# Patient Record
Sex: FEMALE | Race: WHITE | NOT HISPANIC OR LATINO | URBAN - METROPOLITAN AREA
[De-identification: names, ages, dates, MRNs, and addresses within clinical notes are randomized per-mention and may not be internally consistent; named-entity substitution may affect disease eponyms.]

---

## 2017-04-13 ENCOUNTER — OFFICE (AMBULATORY)
Dept: URBAN - METROPOLITAN AREA CLINIC 75 | Facility: CLINIC | Age: 47
End: 2017-04-13

## 2017-04-13 VITALS
DIASTOLIC BLOOD PRESSURE: 76 MMHG | HEIGHT: 62 IN | HEART RATE: 71 BPM | SYSTOLIC BLOOD PRESSURE: 128 MMHG | WEIGHT: 193 LBS

## 2017-04-13 DIAGNOSIS — R10.13 EPIGASTRIC PAIN: ICD-10-CM

## 2017-04-13 DIAGNOSIS — R10.32 LEFT LOWER QUADRANT PAIN: ICD-10-CM

## 2017-04-13 DIAGNOSIS — K59.00 CONSTIPATION, UNSPECIFIED: ICD-10-CM

## 2017-04-13 DIAGNOSIS — R19.4 CHANGE IN BOWEL HABIT: ICD-10-CM

## 2017-04-13 DIAGNOSIS — K51.511 LEFT SIDED COLITIS WITH RECTAL BLEEDING: ICD-10-CM

## 2017-04-13 DIAGNOSIS — R63.4 ABNORMAL WEIGHT LOSS: ICD-10-CM

## 2017-04-13 DIAGNOSIS — R63.0 ANOREXIA: ICD-10-CM

## 2017-04-13 DIAGNOSIS — R93.3 ABNORMAL FINDINGS ON DIAGNOSTIC IMAGING OF OTHER PARTS OF DI: ICD-10-CM

## 2017-04-13 PROCEDURE — 99204 OFFICE O/P NEW MOD 45 MIN: CPT | Performed by: INTERNAL MEDICINE

## 2017-04-13 NOTE — SERVICEHPINOTES
Thank you very much for allowing me to evaluate Ms. Garcia. As you know she is a pleasant 46-year-old white female who I actually saw about 13 years ago, she underwent cholecystectomy and I did an ERCP on the patient. She is here now because of weeks ago she ended up in the hospital. She says that she went to the bathroom, she had some cramping and passed blood. The symptoms persisted associated of going to be emerging room and was admitted. CT scan reportedly showed colitis and she said she did not think that passed blood multiple times. She was in the hospital for 5 days. She was evaluated by infectious disease, workup for C. difficile was negative. She was treated with antibiotics and dicyclomine. She says when she was discharged she was only on clears but she said she needed to get out of the hospital. She also has had loss of appetite, she said she is only really been able to tolerate solid food for the past week. She said result weight loss. Her bleeding has resolved. Her abdominal pain has improved. She is actually now is telling me she is constipated and has not had a bowel movement in a couple of days. There is no family history of polyps, colitis or colon cancer. She does not look acutely ill.Again she's had some loss of appetite and weight loss. There is no dysphagia, odynophagia, reflux, melena or hematemesis. She also has a history of right upper quadrant pain, again she is status post cholecystectomy and ERCP years ago.She does have a history of alternating diarrhea and constipation consistent with irritable bowel syndrome. She is not a smoker or drinker. She is in no distress. She does not look acutely ill.

## 2017-04-24 VITALS
HEIGHT: 62 IN | SYSTOLIC BLOOD PRESSURE: 125 MMHG | DIASTOLIC BLOOD PRESSURE: 75 MMHG | HEART RATE: 88 BPM | RESPIRATION RATE: 21 BRPM | SYSTOLIC BLOOD PRESSURE: 106 MMHG | DIASTOLIC BLOOD PRESSURE: 54 MMHG | DIASTOLIC BLOOD PRESSURE: 49 MMHG | HEART RATE: 103 BPM | OXYGEN SATURATION: 98 % | HEART RATE: 89 BPM | SYSTOLIC BLOOD PRESSURE: 114 MMHG | WEIGHT: 193 LBS | SYSTOLIC BLOOD PRESSURE: 111 MMHG | SYSTOLIC BLOOD PRESSURE: 124 MMHG | SYSTOLIC BLOOD PRESSURE: 134 MMHG | RESPIRATION RATE: 23 BRPM | HEART RATE: 75 BPM | DIASTOLIC BLOOD PRESSURE: 64 MMHG | RESPIRATION RATE: 33 BRPM | HEART RATE: 85 BPM | DIASTOLIC BLOOD PRESSURE: 70 MMHG | RESPIRATION RATE: 18 BRPM | HEART RATE: 92 BPM | RESPIRATION RATE: 24 BRPM | RESPIRATION RATE: 22 BRPM | DIASTOLIC BLOOD PRESSURE: 66 MMHG | RESPIRATION RATE: 26 BRPM | HEART RATE: 96 BPM | SYSTOLIC BLOOD PRESSURE: 117 MMHG | OXYGEN SATURATION: 97 % | HEART RATE: 91 BPM | OXYGEN SATURATION: 96 % | DIASTOLIC BLOOD PRESSURE: 65 MMHG | DIASTOLIC BLOOD PRESSURE: 71 MMHG | SYSTOLIC BLOOD PRESSURE: 123 MMHG | DIASTOLIC BLOOD PRESSURE: 78 MMHG | OXYGEN SATURATION: 94 % | HEART RATE: 93 BPM | SYSTOLIC BLOOD PRESSURE: 118 MMHG | SYSTOLIC BLOOD PRESSURE: 136 MMHG | RESPIRATION RATE: 20 BRPM | TEMPERATURE: 96.8 F | SYSTOLIC BLOOD PRESSURE: 141 MMHG | DIASTOLIC BLOOD PRESSURE: 63 MMHG | DIASTOLIC BLOOD PRESSURE: 101 MMHG | OXYGEN SATURATION: 100 % | DIASTOLIC BLOOD PRESSURE: 83 MMHG | HEART RATE: 94 BPM | HEART RATE: 90 BPM

## 2017-04-25 ENCOUNTER — OFFICE (AMBULATORY)
Dept: URBAN - METROPOLITAN AREA CLINIC 64 | Facility: CLINIC | Age: 47
End: 2017-04-25

## 2017-04-25 ENCOUNTER — AMBULATORY SURGICAL CENTER (AMBULATORY)
Dept: URBAN - METROPOLITAN AREA SURGERY 17 | Facility: SURGERY | Age: 47
End: 2017-04-25
Payer: COMMERCIAL

## 2017-04-25 DIAGNOSIS — R93.3 ABNORMAL FINDINGS ON DIAGNOSTIC IMAGING OF OTHER PARTS OF DI: ICD-10-CM

## 2017-04-25 DIAGNOSIS — C18.2 MALIGNANT NEOPLASM OF ASCENDING COLON: ICD-10-CM

## 2017-04-25 DIAGNOSIS — K51.511 LEFT SIDED COLITIS WITH RECTAL BLEEDING: ICD-10-CM

## 2017-04-25 DIAGNOSIS — R63.4 ABNORMAL WEIGHT LOSS: ICD-10-CM

## 2017-04-25 DIAGNOSIS — K31.7 POLYP OF STOMACH AND DUODENUM: ICD-10-CM

## 2017-04-25 DIAGNOSIS — R63.0 ANOREXIA: ICD-10-CM

## 2017-04-25 DIAGNOSIS — D12.0 BENIGN NEOPLASM OF CECUM: ICD-10-CM

## 2017-04-25 DIAGNOSIS — R19.4 CHANGE IN BOWEL HABIT: ICD-10-CM

## 2017-04-25 DIAGNOSIS — K44.9 DIAPHRAGMATIC HERNIA WITHOUT OBSTRUCTION OR GANGRENE: ICD-10-CM

## 2017-04-25 DIAGNOSIS — R10.13 EPIGASTRIC PAIN: ICD-10-CM

## 2017-04-25 PROBLEM — D13.1 BENIGN NEOPLASM OF STOMACH: Status: ACTIVE | Noted: 2017-04-25

## 2017-04-25 PROBLEM — K29.70 GASTRITIS, UNSPECIFIED, WITHOUT BLEEDING: Status: ACTIVE | Noted: 2017-04-25

## 2017-04-25 PROBLEM — D12.2 BENIGN NEOPLASM OF ASCENDING COLON: Status: ACTIVE | Noted: 2017-04-25

## 2017-04-25 LAB
GI HISTOLOGY: A. SELECT: (no result)
GI HISTOLOGY: B. SELECT: (no result)
GI HISTOLOGY: C. UNSPECIFIED: (no result)
GI HISTOLOGY: D. UNSPECIFIED: (no result)
GI HISTOLOGY: E. SELECT: (no result)
GI HISTOLOGY: F. UNSPECIFIED: (no result)
GI HISTOLOGY: G. UNSPECIFIED: (no result)
GI HISTOLOGY: H. SELECT: (no result)
GI HISTOLOGY: PDF REPORT: (no result)

## 2017-04-25 PROCEDURE — 43239 EGD BIOPSY SINGLE/MULTIPLE: CPT | Performed by: INTERNAL MEDICINE

## 2017-04-25 PROCEDURE — 45385 COLONOSCOPY W/LESION REMOVAL: CPT | Performed by: INTERNAL MEDICINE

## 2017-04-25 PROCEDURE — 45380 COLONOSCOPY AND BIOPSY: CPT | Mod: 59 | Performed by: INTERNAL MEDICINE

## 2017-04-25 PROCEDURE — 88305 TISSUE EXAM BY PATHOLOGIST: CPT | Performed by: INTERNAL MEDICINE

## 2017-04-25 PROCEDURE — 45382 COLONOSCOPY W/CONTROL BLEED: CPT | Mod: 59 | Performed by: INTERNAL MEDICINE

## 2017-04-25 RX ORDER — CIPROFLOXACIN HYDROCHLORIDE 500 MG/1
1000 TABLET, FILM COATED ORAL
Qty: 20 | Refills: 0 | Status: ACTIVE
Start: 2017-04-25

## 2017-04-25 RX ORDER — METRONIDAZOLE 500 MG/1
1500 TABLET, FILM COATED ORAL
Qty: 30 | Refills: 0 | Status: ACTIVE
Start: 2017-04-25

## 2017-04-25 RX ADMIN — PROPOFOL 50 MG: 10 INJECTION, EMULSION INTRAVENOUS at 11:54

## 2017-04-25 RX ADMIN — PROPOFOL 50 MG: 10 INJECTION, EMULSION INTRAVENOUS at 11:44

## 2017-04-25 RX ADMIN — PROPOFOL 50 MG: 10 INJECTION, EMULSION INTRAVENOUS at 11:48

## 2017-04-25 RX ADMIN — PROPOFOL 50 MG: 10 INJECTION, EMULSION INTRAVENOUS at 11:40

## 2017-04-25 RX ADMIN — PROPOFOL 100 MG: 10 INJECTION, EMULSION INTRAVENOUS at 11:36

## 2017-04-25 RX ADMIN — PROPOFOL 50 MG: 10 INJECTION, EMULSION INTRAVENOUS at 11:38

## 2017-04-25 RX ADMIN — PROPOFOL 25 MG: 10 INJECTION, EMULSION INTRAVENOUS at 12:06

## 2017-04-25 RX ADMIN — PROPOFOL 25 MG: 10 INJECTION, EMULSION INTRAVENOUS at 11:59

## 2017-04-25 RX ADMIN — PROPOFOL 25 MG: 10 INJECTION, EMULSION INTRAVENOUS at 12:00

## 2017-04-25 RX ADMIN — PROPOFOL 25 MG: 10 INJECTION, EMULSION INTRAVENOUS at 12:03

## 2017-04-25 RX ADMIN — LIDOCAINE HYDROCHLORIDE 50 MG: 10 INJECTION, SOLUTION EPIDURAL; INFILTRATION; INTRACAUDAL; PERINEURAL at 11:36

## 2017-04-25 RX ADMIN — PROPOFOL 50 MG: 10 INJECTION, EMULSION INTRAVENOUS at 11:42

## 2017-04-25 RX ADMIN — PROPOFOL 50 MG: 10 INJECTION, EMULSION INTRAVENOUS at 11:58

## 2017-04-25 RX ADMIN — PROPOFOL 25 MG: 10 INJECTION, EMULSION INTRAVENOUS at 11:52

## 2017-04-25 RX ADMIN — PROPOFOL 25 MG: 10 INJECTION, EMULSION INTRAVENOUS at 11:56

## 2017-04-25 RX ADMIN — PROPOFOL 50 MG: 10 INJECTION, EMULSION INTRAVENOUS at 11:46

## 2017-04-25 RX ADMIN — PROPOFOL 50 MG: 10 INJECTION, EMULSION INTRAVENOUS at 11:50

## 2021-05-21 DIAGNOSIS — M25.562 PAIN IN BOTH KNEES, UNSPECIFIED CHRONICITY: Primary | ICD-10-CM

## 2021-05-21 DIAGNOSIS — M25.561 PAIN IN BOTH KNEES, UNSPECIFIED CHRONICITY: Primary | ICD-10-CM

## 2021-05-24 PROBLEM — M25.561 PAIN IN BOTH KNEES: Status: ACTIVE | Noted: 2021-05-24

## 2021-05-24 PROBLEM — M25.562 PAIN IN BOTH KNEES: Status: ACTIVE | Noted: 2021-05-24

## 2021-07-16 ENCOUNTER — TELEPHONE (OUTPATIENT)
Dept: GASTROENTEROLOGY | Facility: CLINIC | Age: 51
End: 2021-07-16

## 2021-07-20 ENCOUNTER — PREP FOR SURGERY (OUTPATIENT)
Dept: SURGERY | Facility: SURGERY CENTER | Age: 51
End: 2021-07-20

## 2021-07-20 ENCOUNTER — TELEPHONE (OUTPATIENT)
Dept: GASTROENTEROLOGY | Facility: CLINIC | Age: 51
End: 2021-07-20

## 2021-07-20 DIAGNOSIS — Z86.010 PERSONAL HISTORY OF COLONIC POLYPS: Primary | ICD-10-CM

## 2021-07-20 DIAGNOSIS — Z80.0 FAMILY HISTORY OF COLON CANCER: ICD-10-CM

## 2021-07-20 DIAGNOSIS — Z83.71 FAMILY HISTORY OF COLONIC POLYPS: ICD-10-CM

## 2021-07-20 DIAGNOSIS — Z85.038 PERSONAL HISTORY OF COLON CANCER: ICD-10-CM

## 2021-07-20 NOTE — TELEPHONE ENCOUNTER
Patient returned call to schedule her screening colonoscopy. Was a former patient of Gregor Holley in [place].

## 2021-07-21 RX ORDER — SODIUM CHLORIDE 0.9 % (FLUSH) 0.9 %
10 SYRINGE (ML) INJECTION AS NEEDED
Status: CANCELLED | OUTPATIENT
Start: 2021-07-21

## 2021-07-21 RX ORDER — SODIUM CHLORIDE 0.9 % (FLUSH) 0.9 %
3 SYRINGE (ML) INJECTION EVERY 12 HOURS SCHEDULED
Status: CANCELLED | OUTPATIENT
Start: 2021-07-21

## 2021-07-21 RX ORDER — SODIUM CHLORIDE, SODIUM LACTATE, POTASSIUM CHLORIDE, CALCIUM CHLORIDE 600; 310; 30; 20 MG/100ML; MG/100ML; MG/100ML; MG/100ML
30 INJECTION, SOLUTION INTRAVENOUS CONTINUOUS PRN
Status: CANCELLED | OUTPATIENT
Start: 2021-07-21

## 2021-08-13 ENCOUNTER — TELEPHONE (OUTPATIENT)
Dept: GASTROENTEROLOGY | Facility: CLINIC | Age: 51
End: 2021-08-13

## 2021-08-13 NOTE — TELEPHONE ENCOUNTER
"Opal,  This patient has an appt for a \"routine\" colonoscopy with Dr Rogers on 10/29.  She left a message on our voice mail asking to move up her appt due to abdominal pain, fecal urgency and blood in her stools.  She was diagnosed with colon cancer three years ago and reports she past due for the colonoscopy.  She had a colectomy at [facility].    I scheduled an appt with you on 08/25 and requested the records.    See secure chat message.    Thanks  Please advise.   "

## 2021-08-17 ENCOUNTER — OFFICE VISIT (OUTPATIENT)
Dept: GASTROENTEROLOGY | Facility: CLINIC | Age: 51
End: 2021-08-17

## 2021-08-17 ENCOUNTER — HOSPITAL ENCOUNTER (OUTPATIENT)
Dept: CT IMAGING | Facility: HOSPITAL | Age: 51
Discharge: HOME | End: 2021-08-17
Admitting: NURSE PRACTITIONER

## 2021-08-17 VITALS
WEIGHT: 210.1 LBS | OXYGEN SATURATION: 98 % | SYSTOLIC BLOOD PRESSURE: 126 MMHG | TEMPERATURE: 97.3 F | HEART RATE: 93 BPM | RESPIRATION RATE: 16 BRPM | BODY MASS INDEX: 38.66 KG/M2 | HEIGHT: 62 IN | DIASTOLIC BLOOD PRESSURE: 74 MMHG

## 2021-08-17 DIAGNOSIS — R63.4 UNINTENTIONAL WEIGHT LOSS: ICD-10-CM

## 2021-08-17 DIAGNOSIS — R19.7 DIARRHEA, UNSPECIFIED TYPE: ICD-10-CM

## 2021-08-17 DIAGNOSIS — Z90.49 HISTORY OF RIGHT HEMICOLECTOMY: ICD-10-CM

## 2021-08-17 DIAGNOSIS — Z85.038 HISTORY OF COLON CANCER: ICD-10-CM

## 2021-08-17 DIAGNOSIS — R19.4 CHANGE IN BOWEL HABITS: ICD-10-CM

## 2021-08-17 DIAGNOSIS — Z98.890 HISTORY OF ERCP: ICD-10-CM

## 2021-08-17 DIAGNOSIS — R10.10 PAIN OF UPPER ABDOMEN: Primary | ICD-10-CM

## 2021-08-17 DIAGNOSIS — R10.30 LOWER ABDOMINAL PAIN: ICD-10-CM

## 2021-08-17 DIAGNOSIS — R10.10 PAIN OF UPPER ABDOMEN: ICD-10-CM

## 2021-08-17 DIAGNOSIS — A04.72 COLITIS, CLOSTRIDIUM DIFFICILE: ICD-10-CM

## 2021-08-17 PROBLEM — C18.9 COLON CANCER (HCC): Status: ACTIVE | Noted: 2021-08-17

## 2021-08-17 PROBLEM — K52.3 INDETERMINATE COLITIS: Status: ACTIVE | Noted: 2021-08-17

## 2021-08-17 PROCEDURE — 74177 CT ABD & PELVIS W/CONTRAST: CPT

## 2021-08-17 PROCEDURE — 0 DIATRIZOATE MEGLUMINE & SODIUM PER 1 ML: Performed by: NURSE PRACTITIONER

## 2021-08-17 PROCEDURE — 99204 OFFICE O/P NEW MOD 45 MIN: CPT | Performed by: NURSE PRACTITIONER

## 2021-08-17 RX ORDER — TIZANIDINE HYDROCHLORIDE 2 MG/1
8 CAPSULE, GELATIN COATED ORAL DAILY PRN
COMMUNITY

## 2021-08-17 RX ORDER — DULOXETIN HYDROCHLORIDE 60 MG/1
60 CAPSULE, DELAYED RELEASE ORAL 2 TIMES DAILY
COMMUNITY
Start: 2021-05-12

## 2021-08-17 RX ORDER — PHENOL 1.4 %
AEROSOL, SPRAY (ML) MUCOUS MEMBRANE
COMMUNITY
End: 2021-09-08 | Stop reason: ALTCHOICE

## 2021-08-17 RX ORDER — ONDANSETRON HYDROCHLORIDE 8 MG/1
8 TABLET, FILM COATED ORAL EVERY 8 HOURS PRN
Qty: 45 TABLET | Refills: 2 | Status: SHIPPED | OUTPATIENT
Start: 2021-08-17 | End: 2022-08-04

## 2021-08-17 RX ADMIN — DIATRIZOATE MEGLUMINE AND DIATRIZOATE SODIUM 30 ML: 660; 100 LIQUID ORAL; RECTAL at 10:50

## 2021-08-17 NOTE — PROGRESS NOTES
Chief Complaint   Patient presents with   • Diarrhea   • FHx Colon Cancer           History of Present Illness  51-year-old female presents today for follow-up.  She has a history of colon cancer status post right hemicolectomy May 2017 She was a previous patient of Dr. Gregor Holley in [facility].  Most recent colonoscopy May 2019, summarized below.  Last EGD 2019 for melena. Dr. Holley recommend colonoscopy in 1 year for monitoring, this has not been performed.    She has symptoms of abdominal pain, fecal urgency and blood in her stool.  Also tarry stools x1.  Blood is described as jaz and bright red with bowel movement.  She will have dripping blood as well in the toilet bowl.  She has not had any blood for the past 2 days.  Symptoms started 3 weeks ago out of the blue.  There is associated postprandial urgency.  This is also a new and worsening symptom.    She has pain in her upper abdomen under her ribs despite a bland diet.  Pain is a 6 or 7 out of 10.  Pain lasts hours after eating.  There is nausea secondary to the pain and headaches.  She is using Zofran.    She has had a 7 pound unintentional weight loss since onset of symptoms.    She has tried Pepcid with no improvement in symptoms.    Diarrhea is worse with oral intake.  She will have up to 9 bowel movements a day that are watery and based on oral intake.    She had dental surgery 2021 and was treated with several courses of antibiotics.  Otherwise she has not had any recent new medications, antibiotic therapy, change in bowel habits or dietary changes.    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        EGD was performed 2019 for melena.  Patient with minimal gastritis in the antrum otherwise normal.    Result Review :       SCANNED - COLONOSCOPY (2019)   ENDOSCOPY, INT (2019)   SCANNED PATHOLOGY (2019)   SCANNED  "PATHOLOGY (05/31/2018)   SCANNED PATHOLOGY (05/10/2017)     Last colonoscopy May 16, 2019 for personal history of colon cancer, screening.  Normal postoperative colon.      May 10, 2017 patient underwent right hemicolectomy of the terminal ileum, cecum and ascending colon.  Patient with adenocarcinoma, low-grade well differentiated to moderately differentiated.  Tumor invades submucosa.  All margins uninvolved.  No lymph nodes involved.    Vital Signs:   /74   Pulse 93   Temp 97.3 °F (36.3 °C) (Temporal)   Resp 16   Ht 157.5 cm (62\")   Wt 95.3 kg (210 lb 1.6 oz)   SpO2 98%   BMI 38.43 kg/m²     Body mass index is 38.43 kg/m².     Physical Exam    Assessment and Plan    Diagnoses and all orders for this visit:    1. Pain of upper abdomen (Primary)  -     CBC & Differential  -     Amylase  -     Lipase  -     Comprehensive Metabolic Panel  -     Vitamin B12  -     CT Abdomen Pelvis With Contrast; Future  -     ondansetron (Zofran) 8 MG tablet; Take 1 tablet by mouth Every 8 (Eight) Hours As Needed for Nausea or Vomiting.  Dispense: 45 tablet; Refill: 2  -     Clostridium Difficile Toxin, PCR - Stool, Per Rectum  -     Gastrointestinal Panel, PCR - Stool, Per Rectum    2. Lower abdominal pain  -     CBC & Differential  -     Amylase  -     Lipase  -     Comprehensive Metabolic Panel  -     Vitamin B12  -     CT Abdomen Pelvis With Contrast; Future  -     Clostridium Difficile Toxin, PCR - Stool, Per Rectum  -     Gastrointestinal Panel, PCR - Stool, Per Rectum    3. Change in bowel habits  -     CBC & Differential  -     Amylase  -     Lipase  -     Comprehensive Metabolic Panel  -     Vitamin B12  -     CT Abdomen Pelvis With Contrast; Future  -     Clostridium Difficile Toxin, PCR - Stool, Per Rectum  -     Gastrointestinal Panel, PCR - Stool, Per Rectum    4. Diarrhea, unspecified type  -     CBC & Differential  -     Amylase  -     Lipase  -     Comprehensive Metabolic Panel  -     Vitamin B12  -     " CT Abdomen Pelvis With Contrast; Future  -     Clostridium Difficile Toxin, PCR - Stool, Per Rectum  -     Gastrointestinal Panel, PCR - Stool, Per Rectum    5. Unintentional weight loss  -     CBC & Differential  -     Amylase  -     Lipase  -     Comprehensive Metabolic Panel  -     Vitamin B12  -     CT Abdomen Pelvis With Contrast; Future  -     ondansetron (Zofran) 8 MG tablet; Take 1 tablet by mouth Every 8 (Eight) Hours As Needed for Nausea or Vomiting.  Dispense: 45 tablet; Refill: 2    6. History of right hemicolectomy  -     CBC & Differential  -     Amylase  -     Lipase  -     Comprehensive Metabolic Panel  -     Vitamin B12  -     CT Abdomen Pelvis With Contrast; Future    7. History of colon cancer  -     CBC & Differential  -     Amylase  -     Lipase  -     Comprehensive Metabolic Panel  -     Vitamin B12  -     CT Abdomen Pelvis With Contrast; Future    8. History of ERCP  -     CBC & Differential  -     Amylase  -     Lipase  -     Comprehensive Metabolic Panel  -     Vitamin B12  -     CT Abdomen Pelvis With Contrast; Future    Change in bowel habits with diarrhea, urgency, frequency, abdominal pain upper and lower constant but worse after eating, nausea, rectal bleeding, personal history of right hemicolectomy for colon cancer, last colonoscopy 2019. Due for colonoscopy in 2020 however this was not completed. Given change in symptoms 3 weeks ago, concern for infectious or inflammatory process as well as a malignancy given her history. Recommend blood work, stool studies and CT scan for further evaluation. Based on results, to consider changing timing of colonoscopy and adding EGD.    Encouraged soft bland liquid or puréed diet while the above work-up is pending to increase caloric intake and maintain hydration.    Will refill Zofran to use as needed for nausea.    Patient verbalized agreement and understanding with the above plan, all questions answered and support provided.    ADDENDUM:   CT  scan has resulted discussed with radiologist, 2.8 cm transverse colon area with circumferential narrowing in the left upper quadrant involving distal transverse colon.  Presence of annular lesion cannot be excluded.  Recommend colonoscopy or barium enema.  Patient informed of these results.  Blood work also reviewed, unremarkable.  Stool studies were pending but have resulted positive for C. difficile.  Also E. coli.  Discussed with Dr. Rogers, will start treatment for C. difficile as this is most likely the cause for her diarrhea, bloody diarrhea, abdominal pain and weight loss.  Will postpone timing of EGD and colonoscopy but these will need to be performed to follow-up on CT scan and her significant history of colon cancer.  Have scheduled follow-up visit in 21 days to reassess, patient is agreeable to contact the office sooner if symptoms do not improve after 10-day course of vancomycin or if she has a return of symptoms upon completion of vancomycin.  Hand hygiene, infection precautions discussed.  Also do not recommend PPI therapy at this time, okay for 2 doses of Pepcid per day given C. difficile infection, would like to avoid PPIs.  Also will arrange for short-term FMLA intermittent during acute illness and for appointments as she is still very symptomatic at this time. Cole        Follow Up   No follow-ups on file.    Patient Instructions   Obtain blood work, stool studies and CT scan for further evaluation of symptoms.    Recommend soft bland diet and puréed or liquids to increase caloric intake and maintain hydration.    Refill of Zofran sent electronically to pharmacy.    Continue Pepcid at this time.    Additional recommendations will be made based on CT scan, labs and stool studies. Based on results, to consider changing timing of endoscopic evaluation as discussed.              EMR Dragon/Transcription Disclaimer:  This document has been Dictated utilizing Dragon dictation.

## 2021-08-17 NOTE — PATIENT INSTRUCTIONS
Obtain blood work, stool studies and CT scan for further evaluation of symptoms.    Recommend soft bland diet and puréed or liquids to increase caloric intake and maintain hydration.    Refill of Zofran sent electronically to pharmacy.    Continue Pepcid at this time.    Additional recommendations will be made based on CT scan, labs and stool studies. Based on results, to consider changing timing of endoscopic evaluation as discussed.

## 2021-08-18 LAB
ALBUMIN SERPL-MCNC: 4 G/DL (ref 3.5–5.2)
ALBUMIN/GLOB SERPL: 1.4 G/DL
ALP SERPL-CCNC: 102 U/L (ref 39–117)
ALT SERPL-CCNC: 20 U/L (ref 1–33)
AMYLASE SERPL-CCNC: 48 U/L (ref 28–100)
AST SERPL-CCNC: 17 U/L (ref 1–32)
BASOPHILS # BLD AUTO: 0.04 10*3/MM3 (ref 0–0.2)
BASOPHILS NFR BLD AUTO: 0.5 % (ref 0–1.5)
BILIRUB SERPL-MCNC: 0.5 MG/DL (ref 0–1.2)
BUN SERPL-MCNC: 10 MG/DL (ref 6–20)
BUN/CREAT SERPL: 15.6 (ref 7–25)
CALCIUM SERPL-MCNC: 9 MG/DL (ref 8.6–10.5)
CHLORIDE SERPL-SCNC: 106 MMOL/L (ref 98–107)
CO2 SERPL-SCNC: 24 MMOL/L (ref 22–29)
CREAT SERPL-MCNC: 0.64 MG/DL (ref 0.57–1)
EOSINOPHIL # BLD AUTO: 0.09 10*3/MM3 (ref 0–0.4)
EOSINOPHIL NFR BLD AUTO: 1 % (ref 0.3–6.2)
ERYTHROCYTE [DISTWIDTH] IN BLOOD BY AUTOMATED COUNT: 12.8 % (ref 12.3–15.4)
GLOBULIN SER CALC-MCNC: 2.8 GM/DL
GLUCOSE SERPL-MCNC: 90 MG/DL (ref 65–99)
HCT VFR BLD AUTO: 39.7 % (ref 34–46.6)
HGB BLD-MCNC: 13.2 G/DL (ref 12–15.9)
IMM GRANULOCYTES # BLD AUTO: 0.04 10*3/MM3 (ref 0–0.05)
IMM GRANULOCYTES NFR BLD AUTO: 0.5 % (ref 0–0.5)
LIPASE SERPL-CCNC: 15 U/L (ref 13–60)
LYMPHOCYTES # BLD AUTO: 1.29 10*3/MM3 (ref 0.7–3.1)
LYMPHOCYTES NFR BLD AUTO: 14.8 % (ref 19.6–45.3)
MCH RBC QN AUTO: 27.8 PG (ref 26.6–33)
MCHC RBC AUTO-ENTMCNC: 33.2 G/DL (ref 31.5–35.7)
MCV RBC AUTO: 83.8 FL (ref 79–97)
MONOCYTES # BLD AUTO: 0.42 10*3/MM3 (ref 0.1–0.9)
MONOCYTES NFR BLD AUTO: 4.8 % (ref 5–12)
NEUTROPHILS # BLD AUTO: 6.85 10*3/MM3 (ref 1.7–7)
NEUTROPHILS NFR BLD AUTO: 78.4 % (ref 42.7–76)
NRBC BLD AUTO-RTO: 0 /100 WBC (ref 0–0.2)
PLATELET # BLD AUTO: 296 10*3/MM3 (ref 140–450)
POTASSIUM SERPL-SCNC: 4.4 MMOL/L (ref 3.5–5.2)
PROT SERPL-MCNC: 6.8 G/DL (ref 6–8.5)
RBC # BLD AUTO: 4.74 10*6/MM3 (ref 3.77–5.28)
SODIUM SERPL-SCNC: 139 MMOL/L (ref 136–145)
VIT B12 SERPL-MCNC: 476 PG/ML (ref 211–946)
WBC # BLD AUTO: 8.73 10*3/MM3 (ref 3.4–10.8)

## 2021-08-19 ENCOUNTER — PREP FOR SURGERY (OUTPATIENT)
Dept: SURGERY | Facility: SURGERY CENTER | Age: 51
End: 2021-08-19

## 2021-08-19 ENCOUNTER — TELEPHONE (OUTPATIENT)
Dept: GASTROENTEROLOGY | Facility: CLINIC | Age: 51
End: 2021-08-19

## 2021-08-19 DIAGNOSIS — R63.4 UNINTENTIONAL WEIGHT LOSS: ICD-10-CM

## 2021-08-19 DIAGNOSIS — R10.10 PAIN OF UPPER ABDOMEN: Primary | ICD-10-CM

## 2021-08-19 DIAGNOSIS — Z90.49 HISTORY OF RIGHT HEMICOLECTOMY: ICD-10-CM

## 2021-08-19 DIAGNOSIS — R19.7 DIARRHEA, UNSPECIFIED TYPE: ICD-10-CM

## 2021-08-19 DIAGNOSIS — Z85.038 HISTORY OF COLON CANCER: ICD-10-CM

## 2021-08-19 DIAGNOSIS — R10.30 LOWER ABDOMINAL PAIN: ICD-10-CM

## 2021-08-19 DIAGNOSIS — R19.4 CHANGE IN BOWEL HABITS: ICD-10-CM

## 2021-08-19 LAB
ADV 40+41 DNA STL QL NAA+NON-PROBE: NOT DETECTED
ASTRO TYP 1-8 RNA STL QL NAA+NON-PROBE: NOT DETECTED
C CAYETANENSIS DNA STL QL NAA+NON-PROBE: NOT DETECTED
C COLI+JEJ+UPSA DNA STL QL NAA+NON-PROBE: NOT DETECTED
C DIF TOX TCDA+TCDB STL QL NAA+NON-PROBE: DETECTED
C DIFF TOX GENS STL QL NAA+PROBE: POSITIVE
CRYPTOSP DNA STL QL NAA+NON-PROBE: NOT DETECTED
E COLI O157 DNA STL QL NAA+NON-PROBE: ABNORMAL
E HISTOLYT DNA STL QL NAA+NON-PROBE: NOT DETECTED
EAEC PAA PLAS AGGR+AATA ST NAA+NON-PRB: DETECTED
EC STX1+STX2 GENES STL QL NAA+NON-PROBE: NOT DETECTED
EPEC EAE GENE STL QL NAA+NON-PROBE: NOT DETECTED
ETEC LTA+ST1A+ST1B TOX ST NAA+NON-PROBE: NOT DETECTED
G LAMBLIA DNA STL QL NAA+NON-PROBE: NOT DETECTED
NOROVIRUS GI+II RNA STL QL NAA+NON-PROBE: NOT DETECTED
P SHIGELLOIDES DNA STL QL NAA+NON-PROBE: NOT DETECTED
RVA RNA STL QL NAA+NON-PROBE: NOT DETECTED
S ENT+BONG DNA STL QL NAA+NON-PROBE: NOT DETECTED
SAPO I+II+IV+V RNA STL QL NAA+NON-PROBE: NOT DETECTED
SHIGELLA SP+EIEC IPAH ST NAA+NON-PROBE: NOT DETECTED
V CHOL+PARA+VUL DNA STL QL NAA+NON-PROBE: NOT DETECTED
V CHOLERAE DNA STL QL NAA+NON-PROBE: NOT DETECTED
Y ENTEROCOL DNA STL QL NAA+NON-PROBE: NOT DETECTED

## 2021-08-19 RX ORDER — SODIUM CHLORIDE, SODIUM LACTATE, POTASSIUM CHLORIDE, CALCIUM CHLORIDE 600; 310; 30; 20 MG/100ML; MG/100ML; MG/100ML; MG/100ML
30 INJECTION, SOLUTION INTRAVENOUS CONTINUOUS PRN
Status: CANCELLED | OUTPATIENT
Start: 2021-08-19

## 2021-08-19 RX ORDER — SODIUM CHLORIDE 0.9 % (FLUSH) 0.9 %
10 SYRINGE (ML) INJECTION AS NEEDED
Status: CANCELLED | OUTPATIENT
Start: 2021-08-19

## 2021-08-19 RX ORDER — SODIUM CHLORIDE 0.9 % (FLUSH) 0.9 %
3 SYRINGE (ML) INJECTION EVERY 12 HOURS SCHEDULED
Status: CANCELLED | OUTPATIENT
Start: 2021-08-19

## 2021-08-19 RX ORDER — VANCOMYCIN HYDROCHLORIDE 125 MG/1
125 CAPSULE ORAL 4 TIMES DAILY
Qty: 40 CAPSULE | Refills: 0 | Status: SHIPPED | OUTPATIENT
Start: 2021-08-19 | End: 2021-08-29

## 2021-08-19 NOTE — TELEPHONE ENCOUNTER
----- Message from REZA Perez sent at 8/18/2021  3:38 PM EDT -----  Lab work reviewed with patient via telephone.    Stool studies are pending.    Please contact patient and arrange EGD and colonoscopy sooner, with Dr. Rogers next week.  Patient is aware that if stool studies result and are negative, we will postpone endoscopic evaluation.Thank you.  Cole

## 2021-08-19 NOTE — TELEPHONE ENCOUNTER
I called the patient who is aware that we will do her scopes on 8/27 [facility] and she has her prep instructions with the arrival time of 1:00. New order was placed with the egd added to the colonoscopy

## 2021-08-20 ENCOUNTER — TELEPHONE (OUTPATIENT)
Dept: GASTROENTEROLOGY | Facility: CLINIC | Age: 51
End: 2021-08-20

## 2021-08-20 DIAGNOSIS — R11.0 NAUSEA: Primary | ICD-10-CM

## 2021-08-20 RX ORDER — PROMETHAZINE HYDROCHLORIDE 12.5 MG/1
12.5 TABLET ORAL EVERY 8 HOURS PRN
Qty: 30 TABLET | Refills: 1 | Status: SHIPPED | OUTPATIENT
Start: 2021-08-20 | End: 2022-02-24

## 2021-08-20 NOTE — TELEPHONE ENCOUNTER
This should improve as she has been diagnosed with C. difficile and should improve with treatment.  In the meantime, she should continue Zofran as needed and can use promethazine low-dose intermittently.  This is sedating and can make patients sleepy so do not recommend taking while driving.  The sedating side effect limit some people from taking it during the day as well.  Again would expect vomiting to improve as treatment for C. difficile has been started.  Thank you.

## 2021-08-20 NOTE — TELEPHONE ENCOUNTER
Patient states that she has been taking Zofran about 3 times a day and she is still having breakthrough vomiting. Please advise.

## 2021-09-08 ENCOUNTER — TELEMEDICINE (OUTPATIENT)
Dept: GASTROENTEROLOGY | Facility: CLINIC | Age: 51
End: 2021-09-08

## 2021-09-08 DIAGNOSIS — A04.72 COLITIS, CLOSTRIDIUM DIFFICILE: Primary | ICD-10-CM

## 2021-09-08 DIAGNOSIS — R93.3 ABNORMAL CT SCAN, COLON: ICD-10-CM

## 2021-09-08 DIAGNOSIS — Z85.038 HISTORY OF COLON CANCER: ICD-10-CM

## 2021-09-08 DIAGNOSIS — Z90.49 HISTORY OF RIGHT HEMICOLECTOMY: ICD-10-CM

## 2021-09-08 PROCEDURE — 99213 OFFICE O/P EST LOW 20 MIN: CPT | Performed by: NURSE PRACTITIONER

## 2021-09-08 NOTE — PROGRESS NOTES
Chief Complaint   Patient presents with   • Follow-up     C. difficile           History of Present Illness  51-year-old female presents today for follow-up.  Last visit August 17, 2021.  She has a history of colon cancer status post right hemicolectomy May 2017.  Previous patient of Dr. Gregor Holley's in [facility].  Most recent colonoscopy May 2019.  Last EGD July 2019 for melena.  Dr. Holley recommended colonoscopy in 1 year for monitoring and this has not been performed.    Given abdominal pain, urgency and blood per rectum, unintentional weight loss, decreased appetite, nausea and urgency CT scan and stool studies as well as blood work were performed after her last visit.  CT scan results summarized below.  Stool studies revealed C. difficile infection.  Patient was prescribed vancomycin 125 mg capsules 4 times daily x10 days.  She completed course of antibiotics externally August 30, 2021.        5 days after starting vancomycin she noticed improvement in nausea, decreased appetite, urgency, frequency and rectal bleeding.       She does not have a bowel movement daily and has returned to her baseline however yesterday she experienced lower abdominal burning after eating and had a bowel movement with urgency 20 minutes after eating.  No blood or dark stools but she is concerned about return of nausea and postprandial bowel movement and burning sensation which all had resolved after treatment for C. Difficile.    She can experience right upper quadrant abdominal pain at times.  She is wondering if this correlates to abnormality on CT scan.    You have chosen to receive care through a telehealth visit.  Do you consent to use a video/audio connection for your medical care today? Yes    Physical Exam  Constitutional:       General: She is not in acute distress.     Appearance: Normal appearance. She is well-developed. She is not ill-appearing, toxic-appearing or diaphoretic.   Pulmonary:      Effort:  No respiratory distress.   Skin:     General: Skin is warm and dry.      Coloration: Skin is not pale.   Neurological:      Mental Status: She is alert.          Result Review :      CT Abdomen Pelvis With Contrast (08/17/2021 13:10)       Assessment and Plan    Diagnoses and all orders for this visit:    1. Colitis, Clostridium difficile (Primary)    C. difficile colitis, completed 10-day course of vancomycin 125 mg 4 times daily approximately August 30, 2021.  Encourage patient to monitor symptoms and provide an update in 1 week.  If symptoms concerning for recurrence persist or worsen in the meantime, patient will provide update and would recommend recheck stool studies for C. difficile.  If symptoms have improved or are stable with no concern for recurrence, will schedule colonoscopy given history of colon cancer and abnormal CT scan findings.    Patient verbalized agreement and understanding with the above plan.  All questions answered and support provided.      EMR Dragon/Transcription Disclaimer:  This document has been Dictated utilizing Dragon dictation.

## 2021-09-15 DIAGNOSIS — K52.3 INDETERMINATE COLITIS: Primary | ICD-10-CM

## 2021-10-05 ENCOUNTER — LAB REQUISITION (OUTPATIENT)
Dept: LAB | Facility: HOSPITAL | Age: 51
End: 2021-10-05

## 2021-10-05 DIAGNOSIS — Z00.00 ENCOUNTER FOR GENERAL ADULT MEDICAL EXAMINATION WITHOUT ABNORMAL FINDINGS: ICD-10-CM

## 2021-10-05 PROCEDURE — U0004 COV-19 TEST NON-CDC HGH THRU: HCPCS | Performed by: INTERNAL MEDICINE

## 2021-10-06 LAB — SARS-COV-2 ORF1AB RESP QL NAA+PROBE: NOT DETECTED

## 2021-10-08 ENCOUNTER — OUTSIDE FACILITY SERVICE (OUTPATIENT)
Dept: GASTROENTEROLOGY | Facility: CLINIC | Age: 51
End: 2021-10-08

## 2021-10-08 PROCEDURE — 45380 COLONOSCOPY AND BIOPSY: CPT | Performed by: INTERNAL MEDICINE

## 2021-10-08 PROCEDURE — 45385 COLONOSCOPY W/LESION REMOVAL: CPT | Performed by: INTERNAL MEDICINE

## 2021-10-08 PROCEDURE — 43239 EGD BIOPSY SINGLE/MULTIPLE: CPT | Performed by: INTERNAL MEDICINE

## 2021-10-14 DIAGNOSIS — K20.90 ESOPHAGITIS: Primary | ICD-10-CM

## 2021-10-14 RX ORDER — PANTOPRAZOLE SODIUM 40 MG/1
40 TABLET, DELAYED RELEASE ORAL DAILY
Qty: 30 TABLET | Refills: 5 | Status: SHIPPED | OUTPATIENT
Start: 2021-10-14 | End: 2022-08-04 | Stop reason: SDUPTHER

## 2021-10-22 ENCOUNTER — TELEPHONE (OUTPATIENT)
Dept: GASTROENTEROLOGY | Facility: CLINIC | Age: 51
End: 2021-10-22

## 2021-10-22 NOTE — TELEPHONE ENCOUNTER
If she feels she has an obstruction, I would recommend that she go to the ER for further evaluation.

## 2021-10-22 NOTE — TELEPHONE ENCOUNTER
Patient called stating that she thinks she is having a small bowel obstruction. She states that she thinks that it is partially resolved. She states that normally there is immediate pain relief. She states that she is still having significant pain. She states that she is having bowel movements that is mucus. She states that she ate a little bit ago, but now she feels awful. She states that she wants to avoid the hospital as much as possible. Please advise.

## 2021-10-28 ENCOUNTER — TRANSCRIBE ORDERS (OUTPATIENT)
Dept: ADMINISTRATIVE | Facility: HOSPITAL | Age: 51
End: 2021-10-28

## 2021-10-28 DIAGNOSIS — E04.2 NONTOXIC MULTINODULAR GOITER: Primary | ICD-10-CM

## 2021-11-03 ENCOUNTER — HOSPITAL ENCOUNTER (OUTPATIENT)
Dept: ULTRASOUND IMAGING | Facility: HOSPITAL | Age: 51
Discharge: HOME | End: 2021-11-03
Admitting: FAMILY MEDICINE

## 2021-11-03 DIAGNOSIS — E04.2 NONTOXIC MULTINODULAR GOITER: ICD-10-CM

## 2021-11-03 PROCEDURE — 76536 US EXAM OF HEAD AND NECK: CPT

## 2022-02-14 DIAGNOSIS — A04.72 COLITIS, CLOSTRIDIUM DIFFICILE: ICD-10-CM

## 2022-02-15 RX ORDER — VANCOMYCIN HYDROCHLORIDE 125 MG/1
CAPSULE ORAL
Qty: 40 CAPSULE | Refills: 0 | OUTPATIENT
Start: 2022-02-15

## 2022-02-23 DIAGNOSIS — R11.0 NAUSEA: ICD-10-CM

## 2022-02-24 RX ORDER — PROMETHAZINE HYDROCHLORIDE 12.5 MG/1
TABLET ORAL
Qty: 30 TABLET | Refills: 1 | Status: SHIPPED | OUTPATIENT
Start: 2022-02-24 | End: 2022-08-04

## 2022-08-04 ENCOUNTER — OFFICE VISIT (OUTPATIENT)
Dept: GASTROENTEROLOGY | Facility: CLINIC | Age: 52
End: 2022-08-04

## 2022-08-04 VITALS
BODY MASS INDEX: 36.05 KG/M2 | SYSTOLIC BLOOD PRESSURE: 138 MMHG | TEMPERATURE: 98 F | OXYGEN SATURATION: 99 % | WEIGHT: 195.9 LBS | HEIGHT: 62 IN | DIASTOLIC BLOOD PRESSURE: 90 MMHG | HEART RATE: 87 BPM

## 2022-08-04 DIAGNOSIS — R07.89 ATYPICAL CHEST PAIN: ICD-10-CM

## 2022-08-04 DIAGNOSIS — Z85.038 HISTORY OF COLON CANCER: ICD-10-CM

## 2022-08-04 DIAGNOSIS — R11.0 NAUSEA: ICD-10-CM

## 2022-08-04 DIAGNOSIS — K20.90 ESOPHAGITIS: Primary | ICD-10-CM

## 2022-08-04 DIAGNOSIS — Z90.49 HISTORY OF RIGHT HEMICOLECTOMY: ICD-10-CM

## 2022-08-04 DIAGNOSIS — R19.4 CHANGE IN BOWEL HABITS: ICD-10-CM

## 2022-08-04 PROCEDURE — 99214 OFFICE O/P EST MOD 30 MIN: CPT | Performed by: NURSE PRACTITIONER

## 2022-08-04 RX ORDER — PANTOPRAZOLE SODIUM 40 MG/1
40 TABLET, DELAYED RELEASE ORAL DAILY
Qty: 30 TABLET | Refills: 5 | Status: SHIPPED | OUTPATIENT
Start: 2022-08-04

## 2022-08-04 RX ORDER — HYDROCODONE BITARTRATE AND ACETAMINOPHEN 10; 325 MG/1; MG/1
1 TABLET ORAL
COMMUNITY

## 2022-08-04 RX ORDER — ONDANSETRON 4 MG/1
4 TABLET, FILM COATED ORAL EVERY 8 HOURS PRN
Qty: 30 TABLET | Refills: 1 | Status: SHIPPED | OUTPATIENT
Start: 2022-08-04 | End: 2023-03-20

## 2022-08-04 RX ORDER — ATORVASTATIN CALCIUM 20 MG/1
TABLET, FILM COATED ORAL
COMMUNITY
Start: 2022-07-08

## 2022-08-04 RX ORDER — LISINOPRIL 20 MG/1
TABLET ORAL
COMMUNITY
Start: 2022-07-30

## 2022-08-04 RX ORDER — LORAZEPAM 1 MG/1
TABLET ORAL AS NEEDED
COMMUNITY
Start: 2022-03-27

## 2022-08-04 RX ORDER — PROMETHAZINE HYDROCHLORIDE AND CODEINE PHOSPHATE 6.25; 1 MG/5ML; MG/5ML
SOLUTION ORAL
COMMUNITY
Start: 2022-07-31

## 2022-08-04 NOTE — PROGRESS NOTES
Chief Complaint   Patient presents with   • Diarrhea         History of Present Illness  52-year-old female presents today for follow-up.  Last visit September 8, 2021.  She has a history of colon cancer status post right hemicolectomy May 2017.  Last EGD and colonoscopy October 8, 2021 with Dr. Rogers.  She also has a history of C. difficile treated with vancomycin 125 mg 4 times daily x10 days August 2021.    Presents today for change in symptoms one month ago with chest pain and change in bowel habits with diarrhea.  She is overall feeling poorly.    She had a change in bowel habits with profuse watery diarrhea after treatment for sinus infection with doxycycline x 14 days.    Diarrhea resolved and she has switched to constipation.  Currently she will go 2 days without a bowel movement then will have frequent bowel movements with diarrhea and rectal bleeding, bright red in color.     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 She is currently having chest pain in her sternum that feels different than ampullary stenosis pain. Pain is described as pressure pain, cardiac work up has been negative and stress test is pending 8/18/2022.  Pain comes and goes and is worse with eating.  No pain with swallowing.    She has heartburn and is taking omeprazole 20 mg over the counter.     She has been taking pain Rx 1/2 pill twice a week for neck pain and and when she takes the pain medication her chest pain is worse.    No diaphoresis, dyspnea upon exertion.  Review of Systems   Constitutional: Positive for activity change, appetite change and fatigue. Negative for chills, diaphoresis and fever.   Cardiovascular: Positive for chest pain.   Gastrointestinal: Positive for constipation, diarrhea and nausea.         Result Review :         SCANNED PATHOLOGY (10/08/2021)   SCANNED - COLONOSCOPY (10/08/2021)   July 18, 2022 C Difficile Toxin Gene RD negative  July 18, 2022 Ova and  "Parasite Negative  Vital Signs:   /90   Pulse 87   Temp 98 °F (36.7 °C)   Ht 157.5 cm (62\")   Wt 88.9 kg (195 lb 14.4 oz)   SpO2 99%   BMI 35.83 kg/m²     Body mass index is 35.83 kg/m².     Physical Exam  Vitals reviewed.   Constitutional:       General: She is not in acute distress.     Appearance: Normal appearance. She is normal weight. She is ill-appearing. She is not toxic-appearing or diaphoretic.      Comments: Patient is in no acute distress but overall does not appear to feel well   Eyes:      General: No scleral icterus.  Cardiovascular:      Rate and Rhythm: Normal rate and regular rhythm.      Pulses: Normal pulses.      Heart sounds: Normal heart sounds.   Pulmonary:      Effort: Pulmonary effort is normal. No respiratory distress.      Breath sounds: Normal breath sounds. No wheezing or rales.   Abdominal:      General: Bowel sounds are normal. There is no distension.      Palpations: Abdomen is soft. Abdomen is not rigid. There is no mass or pulsatile mass.      Tenderness: There is abdominal tenderness (epiGastric and mid sternum). There is no guarding or rebound.   Lymphadenopathy:      Cervical: No cervical adenopathy.   Skin:     General: Skin is warm and dry.      Coloration: Skin is not jaundiced.   Neurological:      Mental Status: She is alert.         Assessment and Plan    Diagnoses and all orders for this visit:    1. Esophagitis (Primary)  -     pantoprazole (PROTONIX) 40 MG EC tablet; Take 1 tablet by mouth Daily.  Dispense: 30 tablet; Refill: 5    2. Atypical chest pain  -     pantoprazole (PROTONIX) 40 MG EC tablet; Take 1 tablet by mouth Daily.  Dispense: 30 tablet; Refill: 5    3. Change in bowel habits  -     CBC & Differential  -     Comprehensive Metabolic Panel  -     Magnesium    4. History of colon cancer    5. History of right hemicolectomy    6. Nausea  -     CBC & Differential  -     Comprehensive Metabolic Panel  -     Magnesium  -     ondansetron (ZOFRAN) 4 MG " tablet; Take 1 tablet by mouth Every 8 (Eight) Hours As Needed for Nausea or Vomiting.  Dispense: 30 tablet; Refill: 1       Will obtain blood work including magnesium as patient has had poor oral intake as she has overall not been feeling well and she has thought that symptoms of diarrhea are worse with eating.  Will use Zofran as needed for nausea, side effect of worsening constipation reviewed.  Encourage lowest dose possible to control symptoms.    Previous EGD October 8, 2021 with 3 cm hiatal hernia and grade a esophagitis.  At the time of her EGD she was put on pantoprazole but has most recently been on 20 mg of PPI therapy.  Suspect worsening esophagitis, will start pantoprazole 40 mg once daily.    Encourage strict reflux precautions and dietary modifications and recommend continue sucralfate and strongly encouraged increased fluids and regular solid food intake.    She will also proceed with cardiac evaluation that has already been scheduled.    She had a change in bowel habits after antibiotic use, C. difficile testing was negative.  History of antibiotic therapy, it is always reasonable to check for C. difficile after antibiotic use.  C. difficile testing was recently negative.    She has had change in bowel habits and switched to constipation with overflow diarrhea every couple of days.  Would like for her to use over-the-counter laxative that she has used in the past on a daily basis to help regulate bowel movements, adjusting dose to help promote more regular evacuation.    Patient verbalized agreement and understanding, support and reassurance provided.    Encourage patient to contact the office with any questions or concerns.    Patient with history of colon cancer that is post right hemicolectomy 2017.  Recent colonoscopy October 8, 2021 with hyperplastic colon polyp.    Will discuss repeat timing of colonoscopy given history of colon cancer with Dr. Rogers and our office will place patient in  recall system accordingly and contact patient with this information as well.  Patient reports normal CEA at the time of her colon cancer diagnosis as well.     EMR Dragon/Transcription Disclaimer:   This document has been Dictated utilizing Dragon dictation.

## 2022-08-05 LAB
ALBUMIN SERPL-MCNC: 4.4 G/DL (ref 3.8–4.8)
ALBUMIN/GLOB SERPL: 1.8 {RATIO} (ref 1.2–2.2)
ALP SERPL-CCNC: 104 IU/L (ref 44–121)
ALT SERPL-CCNC: 14 IU/L (ref 0–32)
AST SERPL-CCNC: 13 IU/L (ref 0–40)
BASOPHILS # BLD AUTO: 0 X10E3/UL (ref 0–0.2)
BASOPHILS NFR BLD AUTO: 0 %
BILIRUB SERPL-MCNC: 0.4 MG/DL (ref 0–1.2)
BUN SERPL-MCNC: 17 MG/DL (ref 6–24)
BUN/CREAT SERPL: 24 (ref 9–23)
CALCIUM SERPL-MCNC: 9.1 MG/DL (ref 8.7–10.2)
CHLORIDE SERPL-SCNC: 101 MMOL/L (ref 96–106)
CO2 SERPL-SCNC: 25 MMOL/L (ref 20–29)
CREAT SERPL-MCNC: 0.72 MG/DL (ref 0.57–1)
EGFRCR SERPLBLD CKD-EPI 2021: 102 ML/MIN/1.73
EOSINOPHIL # BLD AUTO: 0.1 X10E3/UL (ref 0–0.4)
EOSINOPHIL NFR BLD AUTO: 2 %
ERYTHROCYTE [DISTWIDTH] IN BLOOD BY AUTOMATED COUNT: 12.9 % (ref 11.7–15.4)
GLOBULIN SER CALC-MCNC: 2.4 G/DL (ref 1.5–4.5)
GLUCOSE SERPL-MCNC: 89 MG/DL (ref 65–99)
HCT VFR BLD AUTO: 39.1 % (ref 34–46.6)
HGB BLD-MCNC: 12.4 G/DL (ref 11.1–15.9)
IMM GRANULOCYTES # BLD AUTO: 0 X10E3/UL (ref 0–0.1)
IMM GRANULOCYTES NFR BLD AUTO: 0 %
LYMPHOCYTES # BLD AUTO: 1.3 X10E3/UL (ref 0.7–3.1)
LYMPHOCYTES NFR BLD AUTO: 15 %
MAGNESIUM SERPL-MCNC: 2 MG/DL (ref 1.6–2.3)
MCH RBC QN AUTO: 28.3 PG (ref 26.6–33)
MCHC RBC AUTO-ENTMCNC: 31.7 G/DL (ref 31.5–35.7)
MCV RBC AUTO: 89 FL (ref 79–97)
MONOCYTES # BLD AUTO: 0.4 X10E3/UL (ref 0.1–0.9)
MONOCYTES NFR BLD AUTO: 4 %
NEUTROPHILS # BLD AUTO: 6.8 X10E3/UL (ref 1.4–7)
NEUTROPHILS NFR BLD AUTO: 79 %
PLATELET # BLD AUTO: 304 X10E3/UL (ref 150–450)
POTASSIUM SERPL-SCNC: 4.1 MMOL/L (ref 3.5–5.2)
PROT SERPL-MCNC: 6.8 G/DL (ref 6–8.5)
RBC # BLD AUTO: 4.38 X10E6/UL (ref 3.77–5.28)
SODIUM SERPL-SCNC: 140 MMOL/L (ref 134–144)
WBC # BLD AUTO: 8.6 X10E3/UL (ref 3.4–10.8)

## 2022-08-19 ENCOUNTER — PATIENT MESSAGE (OUTPATIENT)
Dept: GASTROENTEROLOGY | Facility: CLINIC | Age: 52
End: 2022-08-19

## 2022-08-22 ENCOUNTER — TELEPHONE (OUTPATIENT)
Dept: GASTROENTEROLOGY | Facility: CLINIC | Age: 52
End: 2022-08-22

## 2022-08-22 RX ORDER — DICYCLOMINE HYDROCHLORIDE 10 MG/1
10 CAPSULE ORAL 3 TIMES DAILY PRN
Qty: 90 CAPSULE | Refills: 5 | Status: SHIPPED | OUTPATIENT
Start: 2022-08-22

## 2022-08-22 NOTE — TELEPHONE ENCOUNTER
----- Message from Kaia Davenport sent at 8/22/2022  8:56 AM EDT -----  Regarding: Colonoscopy  I had an anaphylaxis reaction to xifaxan which was treated in the ER last night. Not sure if other treatment options for me.

## 2022-11-09 ENCOUNTER — TRANSCRIBE ORDERS (OUTPATIENT)
Dept: ADMINISTRATIVE | Facility: HOSPITAL | Age: 52
End: 2022-11-09

## 2022-11-09 DIAGNOSIS — E03.2 IATROGENIC HYPOTHYROIDISM: Primary | ICD-10-CM

## 2022-11-15 ENCOUNTER — HOSPITAL ENCOUNTER (OUTPATIENT)
Dept: ULTRASOUND IMAGING | Facility: HOSPITAL | Age: 52
End: 2022-11-15

## 2022-11-29 ENCOUNTER — APPOINTMENT (OUTPATIENT)
Dept: ULTRASOUND IMAGING | Facility: HOSPITAL | Age: 52
End: 2022-11-29

## 2022-11-30 ENCOUNTER — LAB (OUTPATIENT)
Dept: LAB | Facility: HOSPITAL | Age: 52
End: 2022-11-30

## 2022-11-30 ENCOUNTER — TRANSCRIBE ORDERS (OUTPATIENT)
Dept: ADMINISTRATIVE | Facility: HOSPITAL | Age: 52
End: 2022-11-30

## 2022-11-30 ENCOUNTER — HOSPITAL ENCOUNTER (OUTPATIENT)
Dept: ULTRASOUND IMAGING | Facility: HOSPITAL | Age: 52
Discharge: HOME | End: 2022-11-30

## 2022-11-30 DIAGNOSIS — Z79.899 ENCOUNTER FOR LONG-TERM (CURRENT) USE OF OTHER MEDICATIONS: ICD-10-CM

## 2022-11-30 DIAGNOSIS — I51.9 MYXEDEMA HEART DISEASE: ICD-10-CM

## 2022-11-30 DIAGNOSIS — D64.9 ANEMIA, UNSPECIFIED TYPE: Primary | ICD-10-CM

## 2022-11-30 DIAGNOSIS — Z85.038 PERSONAL HISTORY OF COLON CANCER: ICD-10-CM

## 2022-11-30 DIAGNOSIS — K21.9 CHALASIA OF LOWER ESOPHAGEAL SPHINCTER: ICD-10-CM

## 2022-11-30 DIAGNOSIS — L30.9 ACUTE DERMATITIS: ICD-10-CM

## 2022-11-30 DIAGNOSIS — E03.9 MYXEDEMA HEART DISEASE: ICD-10-CM

## 2022-11-30 DIAGNOSIS — D64.9 ANEMIA, UNSPECIFIED TYPE: ICD-10-CM

## 2022-11-30 DIAGNOSIS — E03.2 IATROGENIC HYPOTHYROIDISM: ICD-10-CM

## 2022-11-30 DIAGNOSIS — R53.83 TIREDNESS: ICD-10-CM

## 2022-11-30 LAB
ALBUMIN SERPL-MCNC: 4.1 G/DL (ref 3.5–5.2)
ALBUMIN/GLOB SERPL: 1.4 G/DL
ALP SERPL-CCNC: 98 U/L (ref 39–117)
ALT SERPL W P-5'-P-CCNC: 13 U/L (ref 1–33)
ANION GAP SERPL CALCULATED.3IONS-SCNC: 7.8 MMOL/L (ref 5–15)
AST SERPL-CCNC: 12 U/L (ref 1–32)
BASOPHILS # BLD AUTO: 0.02 10*3/MM3 (ref 0–0.2)
BASOPHILS NFR BLD AUTO: 0.3 % (ref 0–1.5)
BILIRUB SERPL-MCNC: 0.2 MG/DL (ref 0–1.2)
BUN SERPL-MCNC: 11 MG/DL (ref 6–20)
BUN/CREAT SERPL: 15.9 (ref 7–25)
CALCIUM SPEC-SCNC: 9.1 MG/DL (ref 8.6–10.5)
CANCER AG125 SERPL QL: 11.6 U/ML (ref 0–38.1)
CHLORIDE SERPL-SCNC: 103 MMOL/L (ref 98–107)
CO2 SERPL-SCNC: 27.2 MMOL/L (ref 22–29)
CREAT SERPL-MCNC: 0.69 MG/DL (ref 0.57–1)
DEPRECATED RDW RBC AUTO: 40.8 FL (ref 37–54)
EGFRCR SERPLBLD CKD-EPI 2021: 105.2 ML/MIN/1.73
EOSINOPHIL # BLD AUTO: 0.13 10*3/MM3 (ref 0–0.4)
EOSINOPHIL NFR BLD AUTO: 1.7 % (ref 0.3–6.2)
ERYTHROCYTE [DISTWIDTH] IN BLOOD BY AUTOMATED COUNT: 12.7 % (ref 12.3–15.4)
GLOBULIN UR ELPH-MCNC: 3 GM/DL
GLUCOSE SERPL-MCNC: 94 MG/DL (ref 65–99)
HCT VFR BLD AUTO: 38.4 % (ref 34–46.6)
HGB BLD-MCNC: 12.5 G/DL (ref 12–15.9)
HIV1+2 AB SER QL: NORMAL
IMM GRANULOCYTES # BLD AUTO: 0.01 10*3/MM3 (ref 0–0.05)
IMM GRANULOCYTES NFR BLD AUTO: 0.1 % (ref 0–0.5)
LYMPHOCYTES # BLD AUTO: 1.48 10*3/MM3 (ref 0.7–3.1)
LYMPHOCYTES NFR BLD AUTO: 19.3 % (ref 19.6–45.3)
MCH RBC QN AUTO: 28.3 PG (ref 26.6–33)
MCHC RBC AUTO-ENTMCNC: 32.6 G/DL (ref 31.5–35.7)
MCV RBC AUTO: 86.9 FL (ref 79–97)
MONOCYTES # BLD AUTO: 0.3 10*3/MM3 (ref 0.1–0.9)
MONOCYTES NFR BLD AUTO: 3.9 % (ref 5–12)
NEUTROPHILS NFR BLD AUTO: 5.72 10*3/MM3 (ref 1.7–7)
NEUTROPHILS NFR BLD AUTO: 74.7 % (ref 42.7–76)
PLATELET # BLD AUTO: 342 10*3/MM3 (ref 140–450)
PMV BLD AUTO: 10.6 FL (ref 6–12)
POTASSIUM SERPL-SCNC: 4.1 MMOL/L (ref 3.5–5.2)
PROT SERPL-MCNC: 7.1 G/DL (ref 6–8.5)
RBC # BLD AUTO: 4.42 10*6/MM3 (ref 3.77–5.28)
RPR SER QL: NORMAL
SODIUM SERPL-SCNC: 138 MMOL/L (ref 136–145)
TSH SERPL DL<=0.05 MIU/L-ACNC: 1.06 UIU/ML (ref 0.27–4.2)
VIT B12 BLD-MCNC: 380 PG/ML (ref 211–946)
WBC NRBC COR # BLD: 7.66 10*3/MM3 (ref 3.4–10.8)

## 2022-11-30 PROCEDURE — G0432 EIA HIV-1/HIV-2 SCREEN: HCPCS

## 2022-11-30 PROCEDURE — 36415 COLL VENOUS BLD VENIPUNCTURE: CPT

## 2022-11-30 PROCEDURE — 82607 VITAMIN B-12: CPT

## 2022-11-30 PROCEDURE — 86304 IMMUNOASSAY TUMOR CA 125: CPT

## 2022-11-30 PROCEDURE — 86592 SYPHILIS TEST NON-TREP QUAL: CPT

## 2022-11-30 PROCEDURE — 80050 GENERAL HEALTH PANEL: CPT

## 2022-11-30 PROCEDURE — 76536 US EXAM OF HEAD AND NECK: CPT

## 2023-03-19 DIAGNOSIS — R11.0 NAUSEA: ICD-10-CM

## 2023-03-20 RX ORDER — ONDANSETRON 4 MG/1
TABLET, FILM COATED ORAL
Qty: 30 TABLET | Refills: 1 | Status: SHIPPED | OUTPATIENT
Start: 2023-03-20

## 2023-04-29 DIAGNOSIS — K20.90 ESOPHAGITIS: ICD-10-CM

## 2023-04-29 DIAGNOSIS — R07.89 ATYPICAL CHEST PAIN: ICD-10-CM

## 2023-05-01 RX ORDER — PANTOPRAZOLE SODIUM 40 MG/1
TABLET, DELAYED RELEASE ORAL
Qty: 30 TABLET | Refills: 5 | Status: SHIPPED | OUTPATIENT
Start: 2023-05-01

## 2023-06-08 ENCOUNTER — TRANSCRIBE ORDERS (OUTPATIENT)
Dept: ADMINISTRATIVE | Facility: HOSPITAL | Age: 53
End: 2023-06-08

## 2023-06-08 DIAGNOSIS — E04.2 NONTOXIC MULTINODULAR GOITER: Primary | ICD-10-CM

## 2023-07-13 PROBLEM — Z85.038 HISTORY OF COLON CANCER: Status: ACTIVE | Noted: 2023-07-13

## 2023-07-13 PROBLEM — R11.0 NAUSEA: Status: ACTIVE | Noted: 2023-07-13

## 2023-07-13 PROBLEM — Z90.49 HISTORY OF RIGHT HEMICOLECTOMY: Status: ACTIVE | Noted: 2023-07-13

## 2023-07-13 PROBLEM — Z80.0 FAMILY HISTORY OF COLON CANCER: Status: ACTIVE | Noted: 2023-07-13

## 2023-07-13 PROBLEM — Z83.719 FAMILY HISTORY OF COLONIC POLYPS: Status: ACTIVE | Noted: 2023-07-13

## 2023-07-13 PROBLEM — R10.10 PAIN OF UPPER ABDOMEN: Status: ACTIVE | Noted: 2023-07-13

## 2024-01-24 DIAGNOSIS — K20.90 ESOPHAGITIS: ICD-10-CM

## 2024-01-24 DIAGNOSIS — R07.89 ATYPICAL CHEST PAIN: ICD-10-CM

## 2024-01-24 RX ORDER — PANTOPRAZOLE SODIUM 40 MG/1
40 TABLET, DELAYED RELEASE ORAL DAILY
Qty: 30 TABLET | Refills: 5 | Status: SHIPPED | OUTPATIENT
Start: 2024-01-24

## 2025-01-16 ENCOUNTER — TRANSCRIBE ORDERS (OUTPATIENT)
Dept: ADMINISTRATIVE | Facility: HOSPITAL | Age: 55
End: 2025-01-16
Payer: COMMERCIAL

## 2025-01-16 DIAGNOSIS — E04.2 NONTOXIC MULTINODULAR GOITER: Primary | ICD-10-CM
